# Patient Record
Sex: FEMALE | Race: WHITE | NOT HISPANIC OR LATINO | URBAN - METROPOLITAN AREA
[De-identification: names, ages, dates, MRNs, and addresses within clinical notes are randomized per-mention and may not be internally consistent; named-entity substitution may affect disease eponyms.]

---

## 2021-08-26 ENCOUNTER — EMERGENCY (EMERGENCY)
Facility: HOSPITAL | Age: 41
LOS: 1 days | Discharge: HOME | End: 2021-08-26
Attending: EMERGENCY MEDICINE | Admitting: EMERGENCY MEDICINE
Payer: COMMERCIAL

## 2021-08-26 VITALS
SYSTOLIC BLOOD PRESSURE: 110 MMHG | WEIGHT: 136.03 LBS | DIASTOLIC BLOOD PRESSURE: 72 MMHG | RESPIRATION RATE: 16 BRPM | OXYGEN SATURATION: 98 % | HEIGHT: 66 IN | TEMPERATURE: 99 F | HEART RATE: 101 BPM

## 2021-08-26 DIAGNOSIS — Z20.822 CONTACT WITH AND (SUSPECTED) EXPOSURE TO COVID-19: ICD-10-CM

## 2021-08-26 DIAGNOSIS — R00.2 PALPITATIONS: ICD-10-CM

## 2021-08-26 DIAGNOSIS — R74.8 ABNORMAL LEVELS OF OTHER SERUM ENZYMES: ICD-10-CM

## 2021-08-26 DIAGNOSIS — R21 RASH AND OTHER NONSPECIFIC SKIN ERUPTION: ICD-10-CM

## 2021-08-26 DIAGNOSIS — B27.90 INFECTIOUS MONONUCLEOSIS, UNSPECIFIED WITHOUT COMPLICATION: ICD-10-CM

## 2021-08-26 DIAGNOSIS — R50.9 FEVER, UNSPECIFIED: ICD-10-CM

## 2021-08-26 LAB
ALBUMIN SERPL ELPH-MCNC: 4.4 G/DL — SIGNIFICANT CHANGE UP (ref 3.3–5)
ALP SERPL-CCNC: 139 U/L — HIGH (ref 40–120)
ALT FLD-CCNC: 228 U/L — HIGH (ref 10–45)
ANION GAP SERPL CALC-SCNC: 8 MMOL/L — SIGNIFICANT CHANGE UP (ref 5–17)
APTT BLD: 30.2 SEC — SIGNIFICANT CHANGE UP (ref 27.5–35.5)
AST SERPL-CCNC: 146 U/L — HIGH (ref 10–40)
BILIRUB SERPL-MCNC: 0.2 MG/DL — SIGNIFICANT CHANGE UP (ref 0.2–1.2)
BUN SERPL-MCNC: 10 MG/DL — SIGNIFICANT CHANGE UP (ref 7–23)
CALCIUM SERPL-MCNC: 9.6 MG/DL — SIGNIFICANT CHANGE UP (ref 8.4–10.5)
CHLORIDE SERPL-SCNC: 104 MMOL/L — SIGNIFICANT CHANGE UP (ref 96–108)
CO2 SERPL-SCNC: 24 MMOL/L — SIGNIFICANT CHANGE UP (ref 22–31)
CREAT SERPL-MCNC: 0.75 MG/DL — SIGNIFICANT CHANGE UP (ref 0.5–1.3)
GLUCOSE SERPL-MCNC: 140 MG/DL — HIGH (ref 70–99)
HAV IGM SER-ACNC: SIGNIFICANT CHANGE UP
HBV CORE IGM SER-ACNC: SIGNIFICANT CHANGE UP
HBV SURFACE AG SER-ACNC: SIGNIFICANT CHANGE UP
HCT VFR BLD CALC: 41.1 % — SIGNIFICANT CHANGE UP (ref 34.5–45)
HCV AB S/CO SERPL IA: 0.04 S/CO — SIGNIFICANT CHANGE UP
HCV AB SERPL-IMP: SIGNIFICANT CHANGE UP
HGB BLD-MCNC: 13.1 G/DL — SIGNIFICANT CHANGE UP (ref 11.5–15.5)
INR BLD: 0.94 — SIGNIFICANT CHANGE UP (ref 0.88–1.16)
LIDOCAIN IGE QN: 36 U/L — SIGNIFICANT CHANGE UP (ref 7–60)
MCHC RBC-ENTMCNC: 27.1 PG — SIGNIFICANT CHANGE UP (ref 27–34)
MCHC RBC-ENTMCNC: 31.9 GM/DL — LOW (ref 32–36)
MCV RBC AUTO: 84.9 FL — SIGNIFICANT CHANGE UP (ref 80–100)
PLATELET # BLD AUTO: 261 K/UL — SIGNIFICANT CHANGE UP (ref 150–400)
POTASSIUM SERPL-MCNC: 4.8 MMOL/L — SIGNIFICANT CHANGE UP (ref 3.5–5.3)
POTASSIUM SERPL-SCNC: 4.8 MMOL/L — SIGNIFICANT CHANGE UP (ref 3.5–5.3)
PROT SERPL-MCNC: 7.9 G/DL — SIGNIFICANT CHANGE UP (ref 6–8.3)
PROTHROM AB SERPL-ACNC: 11.3 SEC — SIGNIFICANT CHANGE UP (ref 10.6–13.6)
RBC # BLD: 4.84 M/UL — SIGNIFICANT CHANGE UP (ref 3.8–5.2)
RBC # FLD: 12.9 % — SIGNIFICANT CHANGE UP (ref 10.3–14.5)
S PYO AG SPEC QL IA: NEGATIVE — SIGNIFICANT CHANGE UP
SARS-COV-2 RNA SPEC QL NAA+PROBE: SIGNIFICANT CHANGE UP
SODIUM SERPL-SCNC: 136 MMOL/L — SIGNIFICANT CHANGE UP (ref 135–145)
WBC # BLD: 6.67 K/UL — SIGNIFICANT CHANGE UP (ref 3.8–10.5)
WBC # FLD AUTO: 6.67 K/UL — SIGNIFICANT CHANGE UP (ref 3.8–10.5)

## 2021-08-26 PROCEDURE — 87086 URINE CULTURE/COLONY COUNT: CPT

## 2021-08-26 PROCEDURE — 86308 HETEROPHILE ANTIBODY SCREEN: CPT

## 2021-08-26 PROCEDURE — 85730 THROMBOPLASTIN TIME PARTIAL: CPT

## 2021-08-26 PROCEDURE — 36415 COLL VENOUS BLD VENIPUNCTURE: CPT

## 2021-08-26 PROCEDURE — 81025 URINE PREGNANCY TEST: CPT

## 2021-08-26 PROCEDURE — 85025 COMPLETE CBC W/AUTO DIFF WBC: CPT

## 2021-08-26 PROCEDURE — 83690 ASSAY OF LIPASE: CPT

## 2021-08-26 PROCEDURE — 93010 ELECTROCARDIOGRAM REPORT: CPT | Mod: NC

## 2021-08-26 PROCEDURE — 99284 EMERGENCY DEPT VISIT MOD MDM: CPT | Mod: 25

## 2021-08-26 PROCEDURE — 81003 URINALYSIS AUTO W/O SCOPE: CPT

## 2021-08-26 PROCEDURE — 80053 COMPREHEN METABOLIC PANEL: CPT

## 2021-08-26 PROCEDURE — 80074 ACUTE HEPATITIS PANEL: CPT

## 2021-08-26 PROCEDURE — 93005 ELECTROCARDIOGRAM TRACING: CPT

## 2021-08-26 PROCEDURE — 87081 CULTURE SCREEN ONLY: CPT

## 2021-08-26 PROCEDURE — 99285 EMERGENCY DEPT VISIT HI MDM: CPT

## 2021-08-26 PROCEDURE — 87635 SARS-COV-2 COVID-19 AMP PRB: CPT

## 2021-08-26 PROCEDURE — 85610 PROTHROMBIN TIME: CPT

## 2021-08-26 PROCEDURE — 87880 STREP A ASSAY W/OPTIC: CPT

## 2021-08-26 RX ORDER — SODIUM CHLORIDE 9 MG/ML
1000 INJECTION INTRAMUSCULAR; INTRAVENOUS; SUBCUTANEOUS ONCE
Refills: 0 | Status: COMPLETED | OUTPATIENT
Start: 2021-08-26 | End: 2021-08-26

## 2021-08-26 RX ORDER — FAMOTIDINE 10 MG/ML
20 INJECTION INTRAVENOUS ONCE
Refills: 0 | Status: COMPLETED | OUTPATIENT
Start: 2021-08-26 | End: 2021-08-26

## 2021-08-26 RX ORDER — DIPHENHYDRAMINE HCL 50 MG
50 CAPSULE ORAL ONCE
Refills: 0 | Status: COMPLETED | OUTPATIENT
Start: 2021-08-26 | End: 2021-08-26

## 2021-08-26 RX ADMIN — Medication 50 MILLIGRAM(S): at 22:42

## 2021-08-26 RX ADMIN — SODIUM CHLORIDE 1000 MILLILITER(S): 9 INJECTION INTRAMUSCULAR; INTRAVENOUS; SUBCUTANEOUS at 23:24

## 2021-08-26 RX ADMIN — FAMOTIDINE 20 MILLIGRAM(S): 10 INJECTION INTRAVENOUS at 22:42

## 2021-08-26 NOTE — ED ADULT NURSE NOTE - NS ED NURSE RECORD ANOTHER VITAL SIGN
Message   Recorded as Task   Date: 01/03/2018 09:56 AM, Created By: Tanvi Garcia   Task Name: Medical Complaint Callback   Assigned To: anna atedgardo triage,Team   Regarding Patient: Hong Reina, Status: In Progress   CommentCrijessie Oliva - 03 Jan 2018 9:56 AM     TASK CREATED  Caller: Ryley Mancia , Mother; Medical Complaint; [phone]  [facility] COMPLAINING [facility] PA[facility] AND HAS DISCHARGE FROM EAR ALSO NOW GETTING FEVER   Donna Wick - 03 Jan 2018 10:52 AM     TASK IN PROGRESS   Donna Wick - 03 Jan 2018 10:59 AM     TASK EDITED   c/o pain from both ears  right ear is draing pus  giving ear drops from ENT to help prevent ear infection x 24 hours   child got tubes in ears nov 1, 2017  made an appt at 1400        Active Problems   1  Asthma (493 90) (J45 909)  2  Cleft hard palate (749 00) (Q35 1)  3  Cleft palate (749 00) (Q35 9)  4  Contact dermatitis (692 9) (L25 9)  5  Wheezing without diagnosis of asthma (786 07) (R06 2)    Current Meds  1  Flovent HFA 44 MCG/ACT Inhalation Aerosol; Therapy: (Recorded:[id number]) to Recorded    Allergies   1   No Known Drug Allergies    Signatures   Electronically signed by : Sharon Montemayor, ; [place]  3 2018 11:00AM EST                       (Author)    Electronically signed by : Damion Villalobos, [facility]; [place]  3 2018 11:52AM EST                       (Author) Yes

## 2021-08-26 NOTE — ED PROVIDER NOTE - CLINICAL SUMMARY MEDICAL DECISION MAKING FREE TEXT BOX
41F no PMH p/w 1d rash i/s/o 3wks fever, sore throat, malaise, and ear pain s/p 7d of a 10d course of amoxicillin. Patient also noted palpitations early today. Patient also told she had elevated liver enzymes 1wk ago. s/p claritin at 2:30pm today and steroid shot at urgent care at 8pm today. Rash has become mildly pruritic since 10pm. Patient with negative covid tests x3 in past 3wks. Has taken amoxicillin in the past without issue.  In the ED, patient generally well-appearing and VSS. Afebrile.  Non-blanching, MP rash on exam with coalescence on trunk and back. R posterior cervical LAD that is non-tender. Pharyngeal erythema with b/l tonsilar exudates.  Will get labs including CBC, CMP, hepatitis panel (given report of elevated liver tests) and monospot. Will also get COVID swab and Strep test. Will get ECG given recent palpitations.  High suspicion for EBV at this time. Also on ddx includes Strep throat (given exudates), COVID, allergic rxn to amoxicillin or other allergen.  Will administer benadryl 50mg IV and pepcid 20mg IV for pruritic rash.

## 2021-08-26 NOTE — ED ADULT NURSE NOTE - NS ED NOTE ABUSE SUSPICION NEGLECT YN
DISCHARGE PLANNING     Discharge to home or other facility with appropriate resources Progressing        DISCHARGE PLANNING - CARE MANAGEMENT     Discharge to post-acute care or home with appropriate resources Progressing        INFECTION - ADULT     Absence or prevention of progression during hospitalization Progressing     Absence of fever/infection during neutropenic period Progressing        Knowledge Deficit     Patient/family/caregiver demonstrates understanding of disease process, treatment plan, medications, and discharge instructions Progressing        Nutrition/Hydration-ADULT     Nutrient/Hydration intake appropriate for improving, restoring or maintaining nutritional needs Progressing        PAIN - ADULT     Verbalizes/displays adequate comfort level or baseline comfort level Progressing        Potential for Falls     Patient will remain free of falls Progressing        SAFETY ADULT     Maintain or return to baseline ADL function Progressing     Maintain or return mobility status to optimal level Progressing No

## 2021-08-26 NOTE — ED ADULT NURSE NOTE - OBJECTIVE STATEMENT
[facility] generalized red raised rash, was given a [facility] shot at [facility], patient explained that she, has been feeling unwell for the past 3 week, fevers, on and off, nausea, loss of appetite,  was diagnosed with ear infection, placed on amoxicillin for 7 days, has take amoxicillin before with no reaction, denies any SOB, difficulty breathing.  Two neg covid tests, rapid 8/6 and PCR 8/11.  Patient fully vaccinated last vaccine 6/16/21.

## 2021-08-26 NOTE — ED PROVIDER NOTE - OBJECTIVE STATEMENT
41F no PMH p/w 1d rash i/s/o 3wks fever, sore throat, malaise, and ear pain. 3wks ago, patient noted low-grade "fevers" (99.6F highest), fatigue, sore throat, and R ear pain. COVID rapid negative at that time. Sx waned and then returned 2wks ago but with L not R ear pain. COVID PCR negative at that time. 1 wk ago developed decreased appetite, b/l ear pain, and nausea on top of the fatigue, fevers, and sore throat. Went to urgent care and had another negative COVID test and was told she had an ear infection and non-specific viral infection. She was also told her liver enzymes were mildly elevated (records not available). She was started on amoxicillin and took 7/10 days of tx. This AM woke up with a rash on trunk and back which has spread to most of body and gotten redder. She also had palpitations and noticed enlarged LNs in her posterior neck. She had a telehealth appointment and was told to stop the amoxicillin and take Claritin which she did at around 2:30pm without relief. Went to urgent care and was given a steroid shot at around 8pm and then came to the ED. Rash became pruritic at around 10pm. 41F no PMH p/w 1d rash i/s/o 3wks fever, sore throat, malaise, and ear pain. 3wks ago, patient noted low-grade "fevers" (99.6F highest), fatigue, sore throat, and R ear pain. COVID test negative at that time. Sx waned and then returned 2wks ago but with L not R ear pain. COVID test negative at that time. 1 wk ago developed decreased appetite, b/l ear pain, and nausea on top of the fatigue, fevers, and sore throat. Went to urgent care and had another negative COVID test and was told she had an ear infection and non-specific viral infection. She was also told her liver enzymes were mildly elevated (records not available). She was started on amoxicillin and took 7/10 days of tx. This AM woke up with a rash on trunk and back which has spread to most of body and gotten redder. She also had palpitations and noticed enlarged LNs in her posterior neck. She had a telehealth appointment and was told to stop the amoxicillin and take Claritin which she did at around 2:30pm without relief. Went to urgent care and was given a steroid shot at around 8pm and then came to the ED. Rash became pruritic at around 10pm. 41F no PMH p/w 1d rash i/s/o 3wks fever, sore throat, malaise, and ear pain. 3wks ago, patient noted low-grade "fevers" (99.6F highest), fatigue, sore throat, and R ear pain. COVID test negative at that time. Sx waned and then returned 2wks ago but with L not R ear pain. COVID test negative at that time. 1 wk ago developed decreased appetite, b/l ear pain, and nausea on top of the fatigue, fevers, and sore throat. Went to urgent care and had another negative COVID test and was told she had an ear infection and non-specific viral infection. She was also told her liver enzymes were mildly elevated (records not available). She was started on amoxicillin and took 7/10 days of tx. This AM woke up with a rash on trunk and back which has spread to most of body and gotten redder. She also had palpitations and noticed enlarged LNs in her posterior neck. She had a telehealth appointment and was told to stop the amoxicillin and take Claritin which she did at around 2:30pm without relief. Went to urgent care and was given a steroid shot at around 8pm and then came to the ED. Rash became pruritic at around 10pm.    Of note, patient has taken amoxicillin in the past and has never had a reaction before.

## 2021-08-26 NOTE — ED PROVIDER NOTE - PHYSICAL EXAMINATION
GENERAL: Awake, alert and interactive, no acute distress  NEURO: A&Ox4, no focal deficits  HEENT: Normocephalic, atraumatic, EOMI,  oral mucosa moist, no oral lesions noted, no pharyngeal erythema  NECK: +L posterior cervical non-tender LAD. Supple, thyroid not palpable  CARDIAC: Regular rate and rhythm, +S1/S2, no murmurs/rubs/gallops  PULM: Breathing comfortably on RA, clear to auscultation bilaterally, no wheezes/rales/rhonchi  ABDOMEN: Soft, nontender, nondistended, +bs  MSK: Range of motion grossly intact  SKIN: MP non-blanching erythematous rash worse and with coalescence on trunk/back also present on arms and legs mostly sparing face, palms, and soles.  VASC: 2+ peripheral pulses, no edema  Psych: Appropriate affect GENERAL: Awake, alert and interactive, no acute distress  NEURO: A&Ox4, no focal deficits  HEENT: +pharyngeal erythema with b/l tonsilar exudates. Normocephalic, atraumatic, EOMI,  oral mucosa moist, no oral lesions noted  NECK: +L posterior cervical non-tender LAD. Supple, thyroid not palpable  CARDIAC: Regular rate and rhythm, +S1/S2, no murmurs/rubs/gallops  PULM: Breathing comfortably on RA, clear to auscultation bilaterally, no wheezes/rales/rhonchi  ABDOMEN: Soft, nontender, nondistended, +bs  MSK: Range of motion grossly intact  SKIN: MP non-blanching erythematous rash worse and with coalescence on trunk/back also present on arms and legs mostly sparing face, palms, and soles.  VASC: 2+ peripheral pulses, no edema  Psych: Appropriate affect

## 2021-08-26 NOTE — ED PROVIDER NOTE - PROGRESS NOTE DETAILS
mono+, advised to discontinue amoxicillin, can continue benadryl  I have discussed the discharge plan with the patient. The patient agrees with the plan, as discussed.  The patient understands Emergency Department diagnosis is a preliminary diagnosis often based on limited information and that the patient must adhere to the follow-up plan as discussed.  The patient understands that if the symptoms worsen the patient may return to the Emergency Department at any time for further evaluation and treatment.

## 2021-08-26 NOTE — ED PROVIDER NOTE - NS ED ROS FT
CONSTITUTIONAL: +fatigue, fevers, decreased appetite  EYES/ENT: +sore throat. No visual changes, hearing changes, or cough  NECK: LAD in post neck  RESPIRATORY: No SOB  CARDIOVASCULAR: No chest pain or current palpitations  GASTROINTESTINAL: No abdominal pain. No current nausea, vomiting, diarrhea.  GENITOURINARY: No dysuria  NEUROLOGICAL: No HA, dizziness, sensorimotor issues  SKIN: +rash all over body with mild pruritis  All other review of systems is negative unless indicated above.

## 2021-08-26 NOTE — ED PROVIDER NOTE - PATIENT PORTAL LINK FT
You can access the FollowMyHealth Patient Portal offered by [facility] by registering at the following website: [email or web address]. By joining [employer]’s FollowMyHealth portal, you will also be able to view your health information using other applications (apps) compatible with our system.

## 2021-08-26 NOTE — ED ADULT TRIAGE NOTE - CHIEF COMPLAINT QUOTE
Woke up this AM with nonpruritic generalized rash possibly due to allergic reaction to Amoxicillin. Referred to ED by [facility] for eval. "Steriod shot" given prior to arrival.

## 2021-08-26 NOTE — ED PROVIDER NOTE - ATTENDING CONTRIBUTION TO CARE
Attending Statement: I have personally performed a face to face diagnostic evaluation on this patient. I have reviewed the resident note and agree with the history, exam and plan of care, except as noted.     Attending Contribution to Care:  41F no PMH p/w 1d rash i/s/o 3wks fever, sore throat, malaise, and ear pain s/p 7d of a 10d course of amoxicillin. Patient also noted palpitations early today. Patient also told she had elevated liver enzymes 1wk ago. s/p claritin at 2:30pm today and steroid shot at urgent care at 8pm today. Rash has become mildly pruritic since 10pm. Patient with negative covid tests x3 in past 3wks. Has taken amoxicillin in the past without issue.  In the ED, patient generally well-appearing and VSS. Afebrile. injected throat with bilateral gray patchy exudates, no swelling, dysphonia, stridor, or trismus.  Non-blanching, MP rash on exam with coalescence on trunk and back. R posterior cervical LAD that is non-tender. Labs checked and LFTs elevations noted, will also get COVID swab and Strep test and monospot. EKG within NL   High suspicion for mono at this time. less likely allergic rxn to amoxicillin or other allergen.  Will administer benadryl 50mg IV and pepcid 20mg IV for pruritic rash. Pt experienced some relief.   Pt signed out to night team pending reassessment and results of mono and strep swabs. If +mono, pt may be dc'd if feels better after hydration and no worsening of rash noted.

## 2021-08-26 NOTE — ED PROVIDER NOTE - NSFOLLOWUPINSTRUCTIONS_ED_ALL_ED_FT
Please take 60mg prednisone daily for the next four days.  Please take Pepcid 20mg twice per day for the next four days.  Please use 25-50mg of Benadryl every 6 hours as needed.  Please follow up with an allergist for allergy testing. [Please take 60mg prednisone daily for the next four days.  Please take Pepcid 20mg twice per day for the next four days.] - if monospot negative  Please use 25-50mg of Benadryl every 6 hours as needed.  Please follow up with an allergist for allergy testing. Please use 25-50mg of Benadryl every 6 hours as needed.  Please follow up with an allergist for allergy testing.  Discontinue amoxicillin.  You can take ibuprofen 600mg every 6hours as needed.      Mononucleosis    WHAT YOU NEED TO KNOW:    What is mononucleosis (mono)? Mono is an infection caused by a virus. Mono is spread through saliva.    What are the signs and symptoms of mono?   •Extreme tiredness or weakness     •Sore throat or swollen tonsils    •Fever    •Tender, swollen lymph nodes on the sides and back of your neck    •Headache and muscle aches    •Night sweats    •Loss of appetite    How is mono diagnosed? Your healthcare provider will ask about your symptoms and examine you. You may need any of the following:   •A blood test may show signs of infection or the virus that causes mono.    •A throat swab may be needed to check for infection. A healthcare provider will rub a cotton swab against the back of your throat.    •An ultrasound or CT scan may show inflammation or damage to your spleen or appendix. You may be given contrast liquid before the CT scan. Tell the healthcare provider if you have ever had an allergic reaction to contrast liquid.    How is mono treated? Your symptoms may last for 4 weeks or longer. You may need any of the following:   •Acetaminophen decreases pain and fever. It is available without a doctor's order. Ask how much to take and how often to take it. Follow directions. Acetaminophen can cause liver damage if not taken correctly.    •NSAIDs, such as ibuprofen, help decrease swelling, pain, and fever. This medicine is available with or without a doctor's order. NSAIDs can cause stomach bleeding or kidney problems in certain people. If you take blood thinner medicine, always ask your healthcare provider if NSAIDs are safe for you. Always read the medicine label and follow directions.    •Steroids help decrease inflammation.    •Antibiotics may be needed if you also have a bacterial infection.    How can I manage my symptoms?   •Rest as needed. Slowly start to do more each day as you feel better.     •Drink liquids as directed. Liquids will help prevent dehydration. Ask how much liquid to drink each day and which liquids are best for you.     •Do not play sports or exercise for 3 to 4 weeks or as directed. When you return for your follow-up visit, your healthcare provider will tell you if you are able to return to full activity.    How can I prevent the spread of mono? Do not share food or drinks. Do not kiss anyone. The virus may be in your saliva for several months after you feel better. Wash your hands often. Use soap and water. Wash your hands after you use the bathroom, change a child's diapers, or sneeze. Wash your hands before you prepare or eat food.     Call 911 for any of the following:   •You have shortness of breath.    •You are confused or have a seizure.    When should I seek immediate care?   •You have severe pain in your abdomen or shoulder.    •You have trouble swallowing because of the pain.    •You urinate very little or not at all.    •Your arms or legs are weak.    When should I contact my healthcare provider?   •Your symptoms get worse, even after treatment.    •You have questions or concerns about your condition or care.    CARE AGREEMENT:    You have the right to help plan your care. Learn about your health condition and how it may be treated. Discuss treatment options with your healthcare providers to decide what care you want to receive. You always have the right to refuse treatment.

## 2021-08-26 NOTE — ED ADULT TRIAGE NOTE - OTHER COMPLAINTS
Pt states she has been feeling generally unwell x3 weeks with sore throat, malaise, low grade fever, worsening this week. Was prescribed Amoxicillin since symptoms worsened, day 7 of 10 today. This evening experienced palpitations, nausea and fever of 100.6. Pt also informed liver enzymes are elevated last week. Denies SOB, edema, drooling.

## 2021-08-26 NOTE — ED ADULT NURSE NOTE - CHPI ED NUR SYMPTOMS NEG
no congestion/no difficulty breathing/no difficulty swallowing/no shortness of breath/no swelling of face, tongue/no vomiting/no wheezing

## 2021-08-27 VITALS
RESPIRATION RATE: 16 BRPM | OXYGEN SATURATION: 97 % | DIASTOLIC BLOOD PRESSURE: 68 MMHG | TEMPERATURE: 98 F | SYSTOLIC BLOOD PRESSURE: 102 MMHG | HEART RATE: 88 BPM

## 2021-08-27 LAB
APPEARANCE UR: CLEAR — SIGNIFICANT CHANGE UP
BASOPHILS # BLD AUTO: 0 K/UL — SIGNIFICANT CHANGE UP (ref 0–0.2)
BASOPHILS NFR BLD AUTO: 0 % — SIGNIFICANT CHANGE UP (ref 0–2)
BILIRUB UR-MCNC: NEGATIVE — SIGNIFICANT CHANGE UP
COLOR SPEC: YELLOW — SIGNIFICANT CHANGE UP
DIFF PNL FLD: NEGATIVE — SIGNIFICANT CHANGE UP
EOSINOPHIL # BLD AUTO: 0 K/UL — SIGNIFICANT CHANGE UP (ref 0–0.5)
EOSINOPHIL NFR BLD AUTO: 0 % — SIGNIFICANT CHANGE UP (ref 0–6)
GLUCOSE UR QL: NEGATIVE — SIGNIFICANT CHANGE UP
HCG UR QL: NEGATIVE — SIGNIFICANT CHANGE UP
HETEROPH AB TITR SER AGGL: POSITIVE
KETONES UR-MCNC: NEGATIVE — SIGNIFICANT CHANGE UP
LEUKOCYTE ESTERASE UR-ACNC: NEGATIVE — SIGNIFICANT CHANGE UP
LYMPHOCYTES # BLD AUTO: 2.39 K/UL — SIGNIFICANT CHANGE UP (ref 1–3.3)
LYMPHOCYTES # BLD AUTO: 35.8 % — SIGNIFICANT CHANGE UP (ref 13–44)
MONOCYTES # BLD AUTO: 0.19 K/UL — SIGNIFICANT CHANGE UP (ref 0–0.9)
MONOCYTES NFR BLD AUTO: 2.8 % — SIGNIFICANT CHANGE UP (ref 2–14)
NEUTROPHILS # BLD AUTO: 3.67 K/UL — SIGNIFICANT CHANGE UP (ref 1.8–7.4)
NEUTROPHILS NFR BLD AUTO: 55 % — SIGNIFICANT CHANGE UP (ref 43–77)
NITRITE UR-MCNC: NEGATIVE — SIGNIFICANT CHANGE UP
NRBC # BLD: SIGNIFICANT CHANGE UP /100 WBCS (ref 0–0)
PH UR: 6.5 — SIGNIFICANT CHANGE UP (ref 5–8)
PROT UR-MCNC: NEGATIVE MG/DL — SIGNIFICANT CHANGE UP
SP GR SPEC: 1.01 — SIGNIFICANT CHANGE UP (ref 1–1.03)
UROBILINOGEN FLD QL: 0.2 E.U./DL — SIGNIFICANT CHANGE UP

## 2021-08-28 ENCOUNTER — EMERGENCY (EMERGENCY)
Facility: HOSPITAL | Age: 41
LOS: 1 days | Discharge: HOME | End: 2021-08-28
Attending: EMERGENCY MEDICINE | Admitting: EMERGENCY MEDICINE
Payer: COMMERCIAL

## 2021-08-28 VITALS
SYSTOLIC BLOOD PRESSURE: 108 MMHG | TEMPERATURE: 99 F | RESPIRATION RATE: 18 BRPM | HEART RATE: 88 BPM | DIASTOLIC BLOOD PRESSURE: 67 MMHG | OXYGEN SATURATION: 100 %

## 2021-08-28 VITALS
HEART RATE: 116 BPM | RESPIRATION RATE: 18 BRPM | DIASTOLIC BLOOD PRESSURE: 67 MMHG | OXYGEN SATURATION: 99 % | WEIGHT: 136.03 LBS | HEIGHT: 66 IN | TEMPERATURE: 99 F | SYSTOLIC BLOOD PRESSURE: 121 MMHG

## 2021-08-28 DIAGNOSIS — R53.1 WEAKNESS: ICD-10-CM

## 2021-08-28 DIAGNOSIS — Z88.1 ALLERGY STATUS TO OTHER ANTIBIOTIC AGENTS STATUS: ICD-10-CM

## 2021-08-28 DIAGNOSIS — R42 DIZZINESS AND GIDDINESS: ICD-10-CM

## 2021-08-28 DIAGNOSIS — L27.0 GENERALIZED SKIN ERUPTION DUE TO DRUGS AND MEDICAMENTS TAKEN INTERNALLY: ICD-10-CM

## 2021-08-28 LAB
ALBUMIN SERPL ELPH-MCNC: 4.2 G/DL — SIGNIFICANT CHANGE UP (ref 3.3–5)
ALP SERPL-CCNC: 129 U/L — HIGH (ref 40–120)
ALT FLD-CCNC: 207 U/L — HIGH (ref 10–45)
ANION GAP SERPL CALC-SCNC: 10 MMOL/L — SIGNIFICANT CHANGE UP (ref 5–17)
AST SERPL-CCNC: 122 U/L — HIGH (ref 10–40)
BILIRUB SERPL-MCNC: 0.2 MG/DL — SIGNIFICANT CHANGE UP (ref 0.2–1.2)
BUN SERPL-MCNC: 10 MG/DL — SIGNIFICANT CHANGE UP (ref 7–23)
CALCIUM SERPL-MCNC: 9.4 MG/DL — SIGNIFICANT CHANGE UP (ref 8.4–10.5)
CHLORIDE SERPL-SCNC: 108 MMOL/L — SIGNIFICANT CHANGE UP (ref 96–108)
CO2 SERPL-SCNC: 23 MMOL/L — SIGNIFICANT CHANGE UP (ref 22–31)
CREAT SERPL-MCNC: 0.71 MG/DL — SIGNIFICANT CHANGE UP (ref 0.5–1.3)
CULTURE RESULTS: NO GROWTH — SIGNIFICANT CHANGE UP
GLUCOSE SERPL-MCNC: 117 MG/DL — HIGH (ref 70–99)
HCT VFR BLD CALC: 38 % — SIGNIFICANT CHANGE UP (ref 34.5–45)
HGB BLD-MCNC: 12.5 G/DL — SIGNIFICANT CHANGE UP (ref 11.5–15.5)
MCHC RBC-ENTMCNC: 28 PG — SIGNIFICANT CHANGE UP (ref 27–34)
MCHC RBC-ENTMCNC: 32.9 GM/DL — SIGNIFICANT CHANGE UP (ref 32–36)
MCV RBC AUTO: 85 FL — SIGNIFICANT CHANGE UP (ref 80–100)
NRBC # BLD: 0 /100 WBCS — SIGNIFICANT CHANGE UP (ref 0–0)
PLATELET # BLD AUTO: 262 K/UL — SIGNIFICANT CHANGE UP (ref 150–400)
POTASSIUM SERPL-MCNC: 4.3 MMOL/L — SIGNIFICANT CHANGE UP (ref 3.5–5.3)
POTASSIUM SERPL-SCNC: 4.3 MMOL/L — SIGNIFICANT CHANGE UP (ref 3.5–5.3)
PROT SERPL-MCNC: 7.4 G/DL — SIGNIFICANT CHANGE UP (ref 6–8.3)
RBC # BLD: 4.47 M/UL — SIGNIFICANT CHANGE UP (ref 3.8–5.2)
RBC # FLD: 13 % — SIGNIFICANT CHANGE UP (ref 10.3–14.5)
SODIUM SERPL-SCNC: 141 MMOL/L — SIGNIFICANT CHANGE UP (ref 135–145)
SPECIMEN SOURCE: SIGNIFICANT CHANGE UP
WBC # BLD: 7.59 K/UL — SIGNIFICANT CHANGE UP (ref 3.8–10.5)
WBC # FLD AUTO: 7.59 K/UL — SIGNIFICANT CHANGE UP (ref 3.8–10.5)

## 2021-08-28 PROCEDURE — 93005 ELECTROCARDIOGRAM TRACING: CPT

## 2021-08-28 PROCEDURE — 36415 COLL VENOUS BLD VENIPUNCTURE: CPT

## 2021-08-28 PROCEDURE — 99283 EMERGENCY DEPT VISIT LOW MDM: CPT

## 2021-08-28 PROCEDURE — 80053 COMPREHEN METABOLIC PANEL: CPT

## 2021-08-28 PROCEDURE — 99284 EMERGENCY DEPT VISIT MOD MDM: CPT

## 2021-08-28 PROCEDURE — 85027 COMPLETE CBC AUTOMATED: CPT

## 2021-08-28 RX ORDER — SODIUM CHLORIDE 9 MG/ML
1000 INJECTION INTRAMUSCULAR; INTRAVENOUS; SUBCUTANEOUS ONCE
Refills: 0 | Status: COMPLETED | OUTPATIENT
Start: 2021-08-28 | End: 2021-08-28

## 2021-08-28 RX ADMIN — SODIUM CHLORIDE 1000 MILLILITER(S): 9 INJECTION INTRAMUSCULAR; INTRAVENOUS; SUBCUTANEOUS at 18:05

## 2021-08-28 RX ADMIN — Medication 40 MILLIGRAM(S): at 18:05

## 2021-08-28 NOTE — ED PROVIDER NOTE - NSFOLLOWUPINSTRUCTIONS_ED_ALL_ED_FT
GENERAL ALLERGIC REACTION - AfterCare(R) Instructions(ER/ED)           General Allergic Reaction    WHAT YOU NEED TO KNOW:    An allergic reaction is your body's response to an allergen. Allergens include medicines, food, insect stings, animal dander, mold, latex, chemicals, and dust mites. Pollen from trees, grass, and weeds can also cause an allergic reaction. An allergic reaction can range from mild to severe.    DISCHARGE INSTRUCTIONS:    Call 911 for signs or symptoms of anaphylaxis, such as trouble breathing, swelling in your mouth or throat, or wheezing. You may also have itching, a rash, hives, or feel like you are going to faint.    Return to the emergency department if:   •You have a skin rash, hives, swelling, or itching that is starting to get worse.      •Your throat tightens, or your lips or tongue swell.      •You have trouble swallowing or speaking.      •You have worsening nausea, diarrhea, or abdominal cramps, or you are vomiting.      •You have chest pain or tightness.      Contact your healthcare provider if:   •You have questions or concerns about your condition or care.          Medicines: You may need any of the following:   •Medicines may be given to relieve certain allergy symptoms such as itching, sneezing, and swelling. You may take them as a pill or use drops in your nose or eyes. Topical treatments may be given to put directly on your skin to help decrease itching or swelling.      •Epinephrine may be prescribed if you are at risk for anaphylaxis. This is a severe allergic reaction that can be life-threatening. Your healthcare provider will tell you if you need to keep epinephrine with you. You will be taught when and how to use it.      •Take your medicine as directed. Contact your healthcare provider if you think your medicine is not helping or if you have side effects. Tell him of her if you are allergic to any medicine. Keep a list of the medicines, vitamins, and herbs you take. Include the amounts, and when and why you take them. Bring the list or the pill bottles to follow-up visits. Carry your medicine list with you in case of an emergency.      Follow up with your doctor as directed: Write down your questions so you remember to ask them during your visits.     Manage your symptoms:   •Avoid allergens. You may need to have allergy testing with your healthcare provider or a specialist to find your allergens.      •Use cold compresses on your skin or eyes. This will help soothe skin or eyes affected by the allergic reaction. You can make a cold compress by soaking a washcloth in cool water. Wring out the extra water before you apply the washcloth.      •Rinse your nasal passages with a saline solution. Daily rinsing may help clear allergens out of your nose. Use distilled water if possible. You can also boil tap water and then let it cool before you use it. Do not use tap water without boiling it first.      •Do not smoke. Nicotine and other chemicals in cigarettes and cigars can make an allergic reaction worse, and can also cause lung damage. Ask your healthcare provider for information if you currently smoke and need help to quit. E-cigarettes or smokeless tobacco still contain nicotine. Talk to your healthcare provider before you use these products.          © Copyright [employer] 2021           back to top                          © Copyright [employer] 2021

## 2021-08-28 NOTE — ED ADULT NURSE NOTE - CHIEF COMPLAINT QUOTE
pt arriving to ED for allergic reaction to Augmentin- was taking Aug 19-26th, started having rash to 8/26, stopped abx and seen in ED. pt endorses generalized body weakness, nausea, lightheadedness and worsening of rash today. took Benadryl today, states possibly made sx worse. no throat/ tongue swelling- speaking in full sentences. no SOB/chest pain

## 2021-08-28 NOTE — ED ADULT TRIAGE NOTE - PRO INTERPRETER NEED 2
Left message for the patient to return to discuss  note listed below to see if a refill is needed or if this was an FYI. Clinic number provided.      English

## 2021-08-28 NOTE — ED PROVIDER NOTE - OBJECTIVE STATEMENT
41 F co dizziness/lightheadedness, weakness- px dx w mono- 10 d of sore throat, myalgias, malaise- on Augmentin for several days- diffuse rash erupted 3-4 days ago- took benadryl with some improvement of itching- but today- after she took it, became very lightheaded, dry mouth, tingling, numbness, felt like she was going to pass out- kerri po this am, relatively minimally  mild-mod severity

## 2021-08-28 NOTE — ED PROVIDER NOTE - PATIENT PORTAL LINK FT
You can access the FollowMyHealth Patient Portal offered by [facility] by registering at the following website: [email or web address]. By joining [employer]’s FollowMyHealth portal, you will also be able to view your health information using other applications (apps) compatible with our system.

## 2021-08-28 NOTE — ED ADULT TRIAGE NOTE - CHIEF COMPLAINT QUOTE
pt arriving to ED for allergic reaction to Augmentin- was taking Aug 19-26th, started having rash to 8/26, stopped abx and seen in ED. pt endorses generalized body weakness, nausea, lightheadedness and worsening of rash today. took Benadryl today, states possibly made sx worse pt arriving to ED for allergic reaction to Augmentin- was taking Aug 19-26th, started having rash to 8/26, stopped abx and seen in ED. pt endorses generalized body weakness, nausea, lightheadedness and worsening of rash today. took Benadryl today, states possibly made sx worse. no throat/ tongue swelling- speaking in full sentences. no SOB/chest pain

## 2021-08-28 NOTE — ED ADULT NURSE NOTE - CHPI ED NUR SYMPTOMS NEG
no difficulty breathing/no difficulty swallowing/no shortness of breath/no swelling of face, tongue/no throat itching/no wheezing

## 2021-08-28 NOTE — ED PROVIDER NOTE - CLINICAL SUMMARY MEDICAL DECISION MAKING FREE TEXT BOX
dizziness/lightheadedness- persistent rash- itch- will hydrate, check ekg dizziness/lightheadedness- persistent rash- itch- will hydrate, check ekg  px looks/feels much better- d/c home- prednisone x 4 days, Claritin and/or low dose benadryl at night prn itch

## 2021-08-29 PROBLEM — Z78.9 OTHER SPECIFIED HEALTH STATUS: Chronic | Status: ACTIVE | Noted: 2021-08-26

## 2023-06-20 NOTE — ED ADULT TRIAGE NOTE - WEIGHT IN KG
61.7
It is advisable to follow up with your primary care provider within the next 2-3 weeks to ensure your medications are appropriate and there are no underlying problems after your procedure.